# Patient Record
Sex: FEMALE | Race: WHITE | NOT HISPANIC OR LATINO | Employment: FULL TIME | URBAN - METROPOLITAN AREA
[De-identification: names, ages, dates, MRNs, and addresses within clinical notes are randomized per-mention and may not be internally consistent; named-entity substitution may affect disease eponyms.]

---
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
## 2022-03-29 ENCOUNTER — HOSPITAL ENCOUNTER (EMERGENCY)
Facility: CLINIC | Age: 22
Discharge: HOME | End: 2022-03-30
Attending: EMERGENCY MEDICINE | Admitting: EMERGENCY MEDICINE
Payer: COMMERCIAL

## 2022-03-29 DIAGNOSIS — R10.10 UPPER ABDOMINAL PAIN: ICD-10-CM

## 2022-03-29 PROCEDURE — 96374 THER/PROPH/DIAG INJ IV PUSH: CPT | Mod: 59

## 2022-03-29 PROCEDURE — 99285 EMERGENCY DEPT VISIT HI MDM: CPT | Mod: 25

## 2022-03-29 PROCEDURE — 96375 TX/PRO/DX INJ NEW DRUG ADDON: CPT

## 2022-03-29 PROCEDURE — 96361 HYDRATE IV INFUSION ADD-ON: CPT

## 2022-03-29 PROCEDURE — 93005 ELECTROCARDIOGRAM TRACING: CPT

## 2022-03-30 ENCOUNTER — APPOINTMENT (OUTPATIENT)
Dept: CT IMAGING | Facility: CLINIC | Age: 22
End: 2022-03-30
Attending: EMERGENCY MEDICINE
Payer: COMMERCIAL

## 2022-03-30 VITALS
DIASTOLIC BLOOD PRESSURE: 71 MMHG | SYSTOLIC BLOOD PRESSURE: 107 MMHG | OXYGEN SATURATION: 97 % | TEMPERATURE: 98 F | RESPIRATION RATE: 18 BRPM | HEART RATE: 86 BPM

## 2022-03-30 LAB
ANION GAP SERPL CALCULATED.3IONS-SCNC: 2 MMOL/L (ref 3–14)
ATRIAL RATE - MUSE: 80 BPM
BASOPHILS # BLD AUTO: 0 10E3/UL (ref 0–0.2)
BASOPHILS NFR BLD AUTO: 0 %
BUN SERPL-MCNC: 11 MG/DL (ref 7–30)
CALCIUM SERPL-MCNC: 8.7 MG/DL (ref 8.5–10.1)
CHLORIDE BLD-SCNC: 106 MMOL/L (ref 94–109)
CO2 SERPL-SCNC: 28 MMOL/L (ref 20–32)
CREAT SERPL-MCNC: 0.68 MG/DL (ref 0.52–1.04)
DIASTOLIC BLOOD PRESSURE - MUSE: NORMAL MMHG
EOSINOPHIL # BLD AUTO: 0.1 10E3/UL (ref 0–0.7)
EOSINOPHIL NFR BLD AUTO: 1 %
ERYTHROCYTE [DISTWIDTH] IN BLOOD BY AUTOMATED COUNT: 11.5 % (ref 10–15)
GFR SERPL CREATININE-BSD FRML MDRD: >90 ML/MIN/1.73M2
GLUCOSE BLD-MCNC: 90 MG/DL (ref 70–99)
HCG SERPL QL: NEGATIVE
HCT VFR BLD AUTO: 41 % (ref 35–47)
HGB BLD-MCNC: 13.7 G/DL (ref 11.7–15.7)
HOLD SPECIMEN: NORMAL
IMM GRANULOCYTES # BLD: 0 10E3/UL
IMM GRANULOCYTES NFR BLD: 0 %
INTERPRETATION ECG - MUSE: NORMAL
LYMPHOCYTES # BLD AUTO: 2.6 10E3/UL (ref 0.8–5.3)
LYMPHOCYTES NFR BLD AUTO: 35 %
MCH RBC QN AUTO: 30.3 PG (ref 26.5–33)
MCHC RBC AUTO-ENTMCNC: 33.4 G/DL (ref 31.5–36.5)
MCV RBC AUTO: 91 FL (ref 78–100)
MONOCYTES # BLD AUTO: 0.7 10E3/UL (ref 0–1.3)
MONOCYTES NFR BLD AUTO: 9 %
NEUTROPHILS # BLD AUTO: 4.1 10E3/UL (ref 1.6–8.3)
NEUTROPHILS NFR BLD AUTO: 55 %
NRBC # BLD AUTO: 0 10E3/UL
NRBC BLD AUTO-RTO: 0 /100
P AXIS - MUSE: 37 DEGREES
PLATELET # BLD AUTO: 338 10E3/UL (ref 150–450)
POTASSIUM BLD-SCNC: 4 MMOL/L (ref 3.4–5.3)
PR INTERVAL - MUSE: 140 MS
QRS DURATION - MUSE: 84 MS
QT - MUSE: 402 MS
QTC - MUSE: 463 MS
R AXIS - MUSE: 91 DEGREES
RBC # BLD AUTO: 4.52 10E6/UL (ref 3.8–5.2)
SODIUM SERPL-SCNC: 136 MMOL/L (ref 133–144)
SYSTOLIC BLOOD PRESSURE - MUSE: NORMAL MMHG
T AXIS - MUSE: 52 DEGREES
TROPONIN I SERPL HS-MCNC: 6 NG/L
TROPONIN I SERPL HS-MCNC: 8 NG/L
VENTRICULAR RATE- MUSE: 80 BPM
WBC # BLD AUTO: 7.5 10E3/UL (ref 4–11)

## 2022-03-30 PROCEDURE — 84484 ASSAY OF TROPONIN QUANT: CPT | Performed by: EMERGENCY MEDICINE

## 2022-03-30 PROCEDURE — 250N000011 HC RX IP 250 OP 636: Performed by: EMERGENCY MEDICINE

## 2022-03-30 PROCEDURE — 85025 COMPLETE CBC W/AUTO DIFF WBC: CPT | Performed by: EMERGENCY MEDICINE

## 2022-03-30 PROCEDURE — 258N000003 HC RX IP 258 OP 636: Performed by: EMERGENCY MEDICINE

## 2022-03-30 PROCEDURE — 36415 COLL VENOUS BLD VENIPUNCTURE: CPT | Performed by: EMERGENCY MEDICINE

## 2022-03-30 PROCEDURE — 84703 CHORIONIC GONADOTROPIN ASSAY: CPT | Performed by: EMERGENCY MEDICINE

## 2022-03-30 PROCEDURE — 74177 CT ABD & PELVIS W/CONTRAST: CPT

## 2022-03-30 PROCEDURE — 80048 BASIC METABOLIC PNL TOTAL CA: CPT | Performed by: EMERGENCY MEDICINE

## 2022-03-30 RX ORDER — IOPAMIDOL 755 MG/ML
500 INJECTION, SOLUTION INTRAVASCULAR ONCE
Status: COMPLETED | OUTPATIENT
Start: 2022-03-30 | End: 2022-03-30

## 2022-03-30 RX ORDER — ONDANSETRON 2 MG/ML
4 INJECTION INTRAMUSCULAR; INTRAVENOUS ONCE
Status: COMPLETED | OUTPATIENT
Start: 2022-03-30 | End: 2022-03-30

## 2022-03-30 RX ORDER — MORPHINE SULFATE 4 MG/ML
4 INJECTION, SOLUTION INTRAMUSCULAR; INTRAVENOUS ONCE
Status: COMPLETED | OUTPATIENT
Start: 2022-03-30 | End: 2022-03-30

## 2022-03-30 RX ADMIN — IOPAMIDOL 75 ML: 755 INJECTION, SOLUTION INTRAVENOUS at 01:30

## 2022-03-30 RX ADMIN — MORPHINE SULFATE 4 MG: 4 INJECTION INTRAVENOUS at 00:36

## 2022-03-30 RX ADMIN — SODIUM CHLORIDE 59 ML: 9 INJECTION, SOLUTION INTRAVENOUS at 01:30

## 2022-03-30 RX ADMIN — ONDANSETRON 4 MG: 2 INJECTION INTRAMUSCULAR; INTRAVENOUS at 00:49

## 2022-03-30 ASSESSMENT — ENCOUNTER SYMPTOMS
MYALGIAS: 1
DYSURIA: 0
DIFFICULTY URINATING: 0
HEMATURIA: 0
ABDOMINAL PAIN: 1
FREQUENCY: 0
BACK PAIN: 0

## 2022-03-30 NOTE — ED TRIAGE NOTES
"Pt arrives from home w/ rib pain. Pt reports she was sitting at work at 1900 when she had sudden onset that her 2 bottom ribs on bilateral sides feel like \"they're on fire\". Reports it hurts to breathe and hurts to the point that she feels like she wants to throw up or pass out. Denies any trauma to her ribs. Denies CP or SOB. A&O X 4.   "

## 2022-03-30 NOTE — DISCHARGE INSTRUCTIONS
Diagnosis: Based on your exam, your symptoms seem more consistent with upper abdominal pain rather than rib pain.  What do you do next:   Continue your home medications unless we have specifically changed them  You can use over-the-counter analgesics as needed.  Follow up as indicated below    When do you return: If you have worsening pain, intractable vomiting, severe chest pain with shortness of breath or fainting, or any other symptoms that concern you, please return to the ED for reevaluation.    Thank you for allowing us to care for you today.

## 2022-03-30 NOTE — ED PROVIDER NOTES
"  History   Chief Complaint:  Rib Pain       The history is provided by the patient.      Marta Ro is a 21 year old female with history of anxiety and gastritis who presents with rib pain. The patient states that she developed pain in her chest and bilateral ribs at 1900 today. She was sitting down when these symptoms began. This pain sometimes radiates to her abdomen, and is worsened by sitting up, general movement, and taking deep breaths. She explains that her ribs \"feel like they are on fire.\" She also notes that she has felt flushed and nauseated the last couple of days. Denies recent injury or lifting. Also denies back pain, urinary symptoms, and sick contacts. Had a negative at-home Covid test.      Review of Systems   Cardiovascular: Positive for chest pain.   Gastrointestinal: Positive for abdominal pain.   Genitourinary: Negative for difficulty urinating, dysuria, frequency, hematuria and urgency.   Musculoskeletal: Positive for myalgias. Negative for back pain.   All other systems reviewed and are negative.      Allergies:  Ciprofloxacin  Cetirizine  Guaifenesin  Ibuprofen  Latex  Robitussin Child Cough-Cold  Sulfamethoxazole-Trimethoprim    Medications:  Ferrous sulfate  Fluoxetine  Gabapentin    Past Medical History:     Anemia  Anxiety  Restless leg syndrome  PTSD  Acute gastritis with hemorrhage  Mast cell activation syndrome  Autonomic dysfunction  Concussion    Past Surgical History:    Appendectomy  T&A  Left sided sinus removed    Family History:    Father: clotting disorder, DVT, PE, high cholesterol  Mother: asthma, cancer, clotting disorder, Crohn's disease, depression  Brother: asthma, depression    Social History:  Presents with father    Physical Exam     Patient Vitals for the past 24 hrs:   BP Temp Temp src Pulse Resp SpO2   03/30/22 0445 107/71 -- -- 86 -- 97 %   03/30/22 0430 96/60 -- -- 78 -- 97 %   03/30/22 0415 106/66 -- -- 76 -- 98 %   03/30/22 0400 107/66 -- -- 77 -- 98 % "   03/30/22 0345 107/72 -- -- 78 -- 98 %   03/30/22 0330 103/69 -- -- -- -- 96 %   03/30/22 0115 121/80 -- -- 73 -- 98 %   03/30/22 0100 116/89 -- -- 79 -- 99 %   03/30/22 0045 (!) 124/94 -- -- -- -- 99 %   03/29/22 2348 (!) 137/103 98  F (36.7  C) Temporal 86 18 100 %       Physical Exam  Constitutional: Vital signs reviewed as above.   Head: No external signs of trauma noted.  Eyes: Pupils are equal, round, and reactive to light.   Neck: No JVD noted  Cardiovascular: Normal rate, regular rhythm and normal heart sounds.  No murmur heard. Equal B/L peripheral pulses.  Pulmonary/Chest: Effort normal and breath sounds normal. No respiratory distress. Patient has no wheezes. Patient has no rales.   Gastrointestinal: Soft. There is no tenderness.   Musculoskeletal/Extremities: No edema noted. Normal tone.  Neurological: Patient is alert and oriented to person, place, and time.   Skin: Skin is warm and dry. There is no diaphoresis noted.   Psychiatric: The patient appears calm.      Emergency Department Course   ECG  ECG obtained at 0232, ECG read at 0237  Normal sinus rhythm  Rightward axis  Borderline ECG   No significant change as compared to prior, dated 09/19/16.  Rate 80 bpm. WV interval 140 ms. QRS duration 84 ms. QT/QTc 402/463 ms. P-R-T axes 37 91 52.     Imaging:  CT Abdomen Pelvis w Contrast   Final Result   IMPRESSION:    1.  Negative CT examination of the abdomen and pelvis.        Report per radiology    Laboratory:  Labs Ordered and Resulted from Time of ED Arrival to Time of ED Departure   BASIC METABOLIC PANEL - Abnormal       Result Value    Sodium 136      Potassium 4.0      Chloride 106      Carbon Dioxide (CO2) 28      Anion Gap 2 (*)     Urea Nitrogen 11      Creatinine 0.68      Calcium 8.7      Glucose 90      GFR Estimate >90     HCG QUALITATIVE PREGNANCY - Normal    hCG Serum Qualitative Negative     TROPONIN I - Normal    Troponin I High Sensitivity 8     TROPONIN I - Normal    Troponin I High  Sensitivity 6     CBC WITH PLATELETS AND DIFFERENTIAL    WBC Count 7.5      RBC Count 4.52      Hemoglobin 13.7      Hematocrit 41.0      MCV 91      MCH 30.3      MCHC 33.4      RDW 11.5      Platelet Count 338      % Neutrophils 55      % Lymphocytes 35      % Monocytes 9      % Eosinophils 1      % Basophils 0      % Immature Granulocytes 0      NRBCs per 100 WBC 0      Absolute Neutrophils 4.1      Absolute Lymphocytes 2.6      Absolute Monocytes 0.7      Absolute Eosinophils 0.1      Absolute Basophils 0.0      Absolute Immature Granulocytes 0.0      Absolute NRBCs 0.0         Emergency Department Course:     Reviewed:  I reviewed nursing notes, vitals, past medical history and Care Everywhere    Assessments/Consults:  ED Course as of 03/30/22 0501   Wed Mar 30, 2022   0007 Obtained history and examined the patient as noted above.   0225 Rechecked and updated.   0313 Rechecked and updated.       Interventions:  0036 Morphine 2 mg IV    0049 Zofran 4 mg IV    Disposition:  The patient was discharged to home.     Impression & Plan     CMS Diagnoses: None    Medical Decision Making:  This 21-year-old female patient presents to the ED due to a sensation of rib pain.  Please see the HPI and exam for specifics.  The patient's physical exam actually pointed to upper abdominal discomfort rather than rib tenderness as there was not tenderness to the lower ribs on direct palpation.  A broad differential was still considered including cardiac etiologies and PE but based on the studies above I am not concerned for ACS and the patient is PERC negative.  The patient underwent a CT of her abdomen and pelvis that did not demonstrate a specific etiology for her symptoms.  She otherwise remained stable and I felt she was safe for discharge.  I encouraged primary care follow-up and certainly return with any new or worsening symptoms.  Anticipatory guidance given prior to discharge.                Diagnosis:    ICD-10-CM    1.  Upper abdominal pain  R10.10        Discharge Medications:  Discharge Medication List as of 3/30/2022  4:44 AM          Scribe Disclosure:  I, Artie Ibanez, am serving as a scribe at 11:56 PM on 3/29/2022 to document services personally performed by Herson Miller DO based on my observations and the provider's statements to me.         Herson Miller DO  03/30/22 0501